# Patient Record
Sex: MALE | Race: WHITE | NOT HISPANIC OR LATINO | Employment: FULL TIME | URBAN - METROPOLITAN AREA
[De-identification: names, ages, dates, MRNs, and addresses within clinical notes are randomized per-mention and may not be internally consistent; named-entity substitution may affect disease eponyms.]

---

## 2017-05-12 ENCOUNTER — TELEPHONE (OUTPATIENT)
Dept: INTERNAL MEDICINE | Facility: CLINIC | Age: 57
End: 2017-05-12

## 2017-05-12 ENCOUNTER — OFFICE VISIT (OUTPATIENT)
Dept: INTERNAL MEDICINE | Facility: CLINIC | Age: 57
End: 2017-05-12

## 2017-05-12 VITALS
WEIGHT: 125 LBS | SYSTOLIC BLOOD PRESSURE: 142 MMHG | HEIGHT: 69 IN | BODY MASS INDEX: 18.51 KG/M2 | OXYGEN SATURATION: 98 % | RESPIRATION RATE: 16 BRPM | TEMPERATURE: 98 F | HEART RATE: 78 BPM | DIASTOLIC BLOOD PRESSURE: 92 MMHG

## 2017-05-12 DIAGNOSIS — R03.0 ELEVATED BLOOD PRESSURE READING IN OFFICE WITHOUT DIAGNOSIS OF HYPERTENSION: ICD-10-CM

## 2017-05-12 DIAGNOSIS — Z02.4 ENCOUNTER FOR CDL (COMMERCIAL DRIVING LICENSE) EXAM: Primary | ICD-10-CM

## 2017-05-12 LAB
BILIRUB SERPL-MCNC: NORMAL MG/DL
BLOOD, POC UA: NORMAL
GLUCOSE UR QL STRIP: NORMAL
KETONES UR QL STRIP: NORMAL
LEUKOCYTE ESTERASE URINE, POC: NORMAL
NITRITE, POC UA: NORMAL
PH, POC UA: 7
PROTEIN, POC: NORMAL
SPECIFIC GRAVITY, POC UA: 1
UROBILINOGEN, POC UA: NORMAL

## 2017-05-12 PROCEDURE — 81000 URINALYSIS NONAUTO W/SCOPE: CPT | Mod: ,,, | Performed by: FAMILY MEDICINE

## 2017-05-12 PROCEDURE — 99456 DISABILITY EXAMINATION: CPT | Mod: 25,,, | Performed by: FAMILY MEDICINE

## 2017-05-12 RX ORDER — ACETAMINOPHEN 500 MG
500 TABLET ORAL
COMMUNITY

## 2017-05-12 NOTE — TELEPHONE ENCOUNTER
cdl forms need to be marked as having corrective lenses.  [facility] will except a fax cover [phone]

## 2017-05-12 NOTE — PROGRESS NOTES
Subjective:       Patient ID: Malcom Marin is a 57 y.o. male.    Chief Complaint: dot    HPI Comments: Here for CDL certificate.  He is a new patient.  He has had CDL in the past.  He has never had a problem with blood pressure review of sleep.  Initial blood pressure 142/92.  All repeats are higher.    Review of Systems   Constitutional: Negative for fever.   Respiratory: Positive for shortness of breath (occas). Negative for cough and chest tightness.    Cardiovascular: Negative for chest pain.   Gastrointestinal: Negative for abdominal pain, blood in stool and diarrhea.   Genitourinary: Negative for dysuria and frequency.   Musculoskeletal: Negative for arthralgias and back pain.   Neurological: Negative for headaches.   Psychiatric/Behavioral: Negative for agitation, dysphoric mood and sleep disturbance. The patient is not nervous/anxious.        Objective:      Physical Exam   Constitutional: He is oriented to person, place, and time. He appears well-developed and well-nourished.   HENT:   Head: Normocephalic and atraumatic.   Right Ear: Tympanic membrane, external ear and ear canal normal.   Left Ear: Tympanic membrane, external ear and ear canal normal.   Nose: Nose normal.   Mouth/Throat: Oropharynx is clear and moist.   Eyes: Conjunctivae and EOM are normal.   Neck: Normal range of motion. Neck supple. No thyromegaly present.   Cardiovascular: Normal rate, regular rhythm and normal heart sounds.    Pulmonary/Chest: Effort normal and breath sounds normal.   Abdominal: Soft. He exhibits no distension. There is no tenderness.   Musculoskeletal: Normal range of motion. He exhibits no edema.   Lymphadenopathy:     He has no cervical adenopathy.   Neurological: He is alert and oriented to person, place, and time.   Skin: Skin is warm and dry.   Psychiatric: He has a normal mood and affect. His behavior is normal.         Assessment/Plan:     1. Encounter for CDL (commercial driving license) exam  POCT  URINALYSIS   2. Elevated blood pressure reading in office without diagnosis of hypertension       He is advised to obtain care including lab work etc. to evaluate his blood pressure further.  He does qualify for a 1 year certificate.  However, next year his blood pressure must be less than or equal to 140/90.

## 2017-05-12 NOTE — TELEPHONE ENCOUNTER
----- Message from Cassy Obrien sent at 5/12/2017  3:17 PM CDT -----  Please call this pt back at [phone] concerning his d o t .

## 2017-05-12 NOTE — MR AVS SNAPSHOT
"    [facility]  [address] [place] [address]  Phone: [phone]  Fax: [phone]                  Malcom Marin   2017 11:00 AM   Office Visit    Description:  Male : 1960   Provider:  Carlota Johnson MD   Department:  [facility]           Reason for Visit     dot           Diagnoses this Visit        Comments    Encounter for CDL (commercial driving license) exam                To Do List           Goals (5 Years of Data)     None      [facility]     [facility] Care Line -  Assistance  Unless otherwise directed by your provider, please contact [facility], our nurse care line that is available for  assistance.     Registered nurses in the [facility] provide: appointment scheduling, clinical advisement, health education, and other advisory services.  Call: [phone] (toll free)               Medications           Message regarding Medications     Verify the changes and/or additions to your medication regime listed below are the same as discussed with your clinician today.  If any of these changes or additions are incorrect, please notify your healthcare provider.             Verify that the below list of medications is an accurate representation of the medications you are currently taking.  If none reported, the list may be blank. If incorrect, please contact your healthcare provider. Carry this list with you in case of emergency.           Current Medications     acetaminophen (TYLENOL) 500 MG tablet Take 500 mg by mouth as needed for Pain.           Clinical Reference Information           Your Vitals Were     BP Pulse Temp Resp Height Weight    142/92 (BP Location: Left arm, Patient Position: Sitting, BP Method: Automatic) 78 97.8 °F (36.6 °C) (Oral) 16 5' 8.5" (1.74 m) 56.7 kg (125 lb)    SpO2 BMI             98% 18.73 kg/m2         Blood Pressure          Most Recent Value    BP  (!)  142/92      Allergies " as of 5/12/2017     Pcn [Penicillins]      Immunizations Administered on Date of Encounter - 5/12/2017     None      Orders Placed During Today's Visit      Normal Orders This Visit    POCT URINALYSIS          5/12/2017  1:09 PM - Chary Hughes LPN      Component Results     Component    WBC, UA    neg    Nitrite, UA    neg    Urobilinogen, UA    neg    Protein    neg    pH, UA    7    Blood, UA    neg    Spec Grav UA    1.005    Ketones, UA    neg    Bilirubin    neg    Glucose, UA    neg            Smoking Cessation     If you would like to quit smoking:   You may be eligible for free services if you are a [place] resident and started smoking cigarettes before September 1, 1988.  Call the Smoking Cessation [employer] ([facility]) toll free at [phone] or [phone].   Call 7-389-QUIT-NOW if you do not meet the above criteria.   Contact us via email: [email or web address]   View our website for more information: [email or web address]        Language Assistance Services     ATTENTION: Language assistance services are available, free of charge. Please call [phone].      ATENCIÓN: Si habla español, tiene a kendrick disposición servicios gratuitos de asistencia lingüística. Llame al [phone].     CHÚ Ý: N?u b?n nói Ti?ng Vi?t, có các d?ch v? h? tr? ngôn ng? mi?n phí dành cho b?n. G?i s? [phone].         [facility] complies with applicable Federal civil rights laws and does not discriminate on the basis of race, color, national origin, age, disability, or sex.

## 2017-05-14 ENCOUNTER — TELEPHONE (OUTPATIENT)
Dept: INTERNAL MEDICINE | Facility: CLINIC | Age: 57
End: 2017-05-14

## 2017-05-15 NOTE — TELEPHONE ENCOUNTER
He passed the vision test without glasses 20/30. That is why I did not juancarlos his certificate needing glasses. Did he fail their testing or just tell them he always wears glasses?    See his vision results in the visit.    Addendum: apparently his license states glasses needed. Staff has already provided necessary addition to his certificate.